# Patient Record
Sex: MALE | Race: WHITE
[De-identification: names, ages, dates, MRNs, and addresses within clinical notes are randomized per-mention and may not be internally consistent; named-entity substitution may affect disease eponyms.]

---

## 2017-05-28 ENCOUNTER — HOSPITAL ENCOUNTER (EMERGENCY)
Dept: HOSPITAL 76 - ED | Age: 47
Discharge: HOME | End: 2017-05-28
Payer: COMMERCIAL

## 2017-05-28 VITALS — DIASTOLIC BLOOD PRESSURE: 83 MMHG | SYSTOLIC BLOOD PRESSURE: 132 MMHG

## 2017-05-28 DIAGNOSIS — M54.6: Primary | ICD-10-CM

## 2017-05-28 DIAGNOSIS — R07.9: ICD-10-CM

## 2017-05-28 DIAGNOSIS — R91.8: ICD-10-CM

## 2017-05-28 LAB
ALBUMIN/GLOB SERPL: 1.6 {RATIO} (ref 1–2.2)
ANION GAP SERPL CALCULATED.4IONS-SCNC: 9 MMOL/L (ref 6–13)
BASOPHILS NFR BLD AUTO: 0 10^3/UL (ref 0–0.1)
BASOPHILS NFR BLD AUTO: 0.6 %
BILIRUB BLD-MCNC: 1.4 MG/DL (ref 0.2–1)
BUN SERPL-MCNC: 21 MG/DL (ref 6–20)
CALCIUM UR-MCNC: 9.6 MG/DL (ref 8.5–10.3)
CHLORIDE SERPL-SCNC: 105 MMOL/L (ref 101–111)
CO2 SERPL-SCNC: 25 MMOL/L (ref 21–32)
CREAT SERPLBLD-SCNC: 1 MG/DL (ref 0.6–1.2)
EOSINOPHIL # BLD AUTO: 0.1 10^3/UL (ref 0–0.7)
EOSINOPHIL NFR BLD AUTO: 1.6 %
ERYTHROCYTE [DISTWIDTH] IN BLOOD BY AUTOMATED COUNT: 14 % (ref 12–15)
GFRSERPLBLD MDRD-ARVRAT: 81 ML/MIN/{1.73_M2} (ref 89–?)
GLOBULIN SER-MCNC: 2.9 G/DL (ref 2.1–4.2)
GLUCOSE SERPL-MCNC: 96 MG/DL (ref 70–100)
HCT VFR BLD AUTO: 43.9 % (ref 42–52)
HGB UR QL STRIP: 15.8 G/DL (ref 14–18)
LIPASE SERPL-CCNC: 22 U/L (ref 22–51)
LYMPHOCYTES # SPEC AUTO: 2.1 10^3/UL (ref 1.5–3.5)
LYMPHOCYTES NFR BLD AUTO: 38.1 %
MCH RBC QN AUTO: 30.9 PG (ref 27–31)
MCHC RBC AUTO-ENTMCNC: 36 G/DL (ref 32–36)
MCV RBC AUTO: 85.8 FL (ref 80–94)
MONOCYTES # BLD AUTO: 0.4 10^3/UL (ref 0–1)
MONOCYTES NFR BLD AUTO: 7.6 %
NEUTROPHILS # BLD AUTO: 2.8 10^3/UL (ref 1.5–6.6)
NEUTROPHILS # SNV AUTO: 5.4 X10^3/UL (ref 4.8–10.8)
NEUTROPHILS NFR BLD AUTO: 52.1 %
NRBC # BLD AUTO: 0 /100WBC
PDW BLD AUTO: 7.4 FL (ref 7.4–11.4)
POTASSIUM SERPL-SCNC: 4.2 MMOL/L (ref 3.5–5)
PROT SPEC-MCNC: 7.6 G/DL (ref 6.7–8.2)
RBC MAR: 5.12 10^6/UL (ref 4.7–6.1)
SODIUM SERPLBLD-SCNC: 139 MMOL/L (ref 135–145)
WBC # BLD: 5.4 X10^3/UL

## 2017-05-28 PROCEDURE — 83690 ASSAY OF LIPASE: CPT

## 2017-05-28 PROCEDURE — 71010: CPT

## 2017-05-28 PROCEDURE — 99283 EMERGENCY DEPT VISIT LOW MDM: CPT

## 2017-05-28 PROCEDURE — 93010 ELECTROCARDIOGRAM REPORT: CPT

## 2017-05-28 PROCEDURE — 84484 ASSAY OF TROPONIN QUANT: CPT

## 2017-05-28 PROCEDURE — 71275 CT ANGIOGRAPHY CHEST: CPT

## 2017-05-28 PROCEDURE — 99285 EMERGENCY DEPT VISIT HI MDM: CPT

## 2017-05-28 PROCEDURE — 36415 COLL VENOUS BLD VENIPUNCTURE: CPT

## 2017-05-28 PROCEDURE — 80053 COMPREHEN METABOLIC PANEL: CPT

## 2017-05-28 PROCEDURE — 85025 COMPLETE CBC W/AUTO DIFF WBC: CPT

## 2017-05-28 PROCEDURE — 93005 ELECTROCARDIOGRAM TRACING: CPT

## 2017-05-28 RX ADMIN — ALUMINUM HYDROXIDE, MAGNESIUM HYDROXIDE, AND SIMETHICONE STA ML: 200; 200; 20 SUSPENSION ORAL at 15:46

## 2017-05-28 RX ADMIN — PHENOBARBITAL, HYOSCYAMINE SULFATE, ATROPINE SULFATE, SCOPOLAMINE HYDROBROMIDE STA ML: 16.2; .1037; .0194; .0065 ELIXIR ORAL at 15:46

## 2017-05-28 RX ADMIN — HYDROCODONE BITARTRATE AND ACETAMINOPHEN STA TAB: 5; 325 TABLET ORAL at 19:29

## 2017-05-28 RX ADMIN — IOPAMIDOL ONE ML: 612 INJECTION, SOLUTION INTRAVENOUS at 17:50

## 2017-05-28 RX ADMIN — LIDOCAINE HYDROCHLORIDE STA ML: 20 SOLUTION ORAL; TOPICAL at 15:46

## 2017-05-28 NOTE — CT REPORT
EXAM:

CT ANGIOGRAM CHEST AND ABDOMEN

 

EXAM DATE: 5/28/2017 05:54 PM.

 

CLINICAL HISTORY: Sudden onset severe chest and thoracic back pain.

 

COMPARISONS: None.

 

TECHNIQUE: Routine axial helical CT angiographic imaging was performed through the chest and abdomen.
 IV Contrast: 100 mL Isovue-300. Reconstructions: Coronal, sagittal, and 3D MIP reconstructions of th
e aorta.

 

In accordance with CT protocol optimization, one or more of the following dose reduction techniques w
ere utilized for this exam: automated exposure control, adjustment of mA and/or KV based on patient s
ize, or use of iterative reconstructive technique.

 

FINDINGS: 

Vascular Structures: Normal. No aneurysm, dissection, or significant atherosclerotic disease of the t
horacic aorta or abdominal aorta. The visualized pulmonary, mesenteric, and solid organ vascular stru
ctures are also within normal limits.

 

Lungs/Pleura: No consolidation, nodules, or edema. No effusions or pneumothorax.

 

Mediastinum: Normal. No cardiac enlargement or adenopathy.

 

Solid Organs: Decreased hepatic attenuation consistent with fatty infiltration. The gallbladder, panc
reas, spleen, adrenal glands and kidneys are unremarkable.

 

Peritoneal Cavity: Normal. No free fluid, free air, or acute inflammatory process.

 

Bones: There is paravertebral soft tissue thickening and stranding from the T6-T11 level. No evidence
 of vertebral body fracture or other destructive bony lesion.

 

Other: None.

 

IMPRESSION: 

1. No evidence of aortic dissection or aneurysm.

2. Mid to lower thoracic spine paravertebral stranding and soft tissue thickening. The adjacent verte
bra appear unremarkable. This may be secondary to infection or neoplastic process such as lymphoma. F
urther evaluation with contrast enhanced thoracic spine MRI recommended.

3. Fatty liver.

 

[facility]

Referring Provider Line: [phone]

 

SITE ID: [id number]

## 2017-05-28 NOTE — CT PRELIMINARY REPORT
Accession: [id number]

Exam: CT Chest Angio (AORTA)

 

IMPRESSION: 

1. No evidence of aortic dissection or aneurysm.

2. Mid to lower thoracic spine paravertebral stranding and soft tissue thickening. The adjacent verte
bra appear unremarkable. This may be secondary to infection or neoplastic process such as lymphoma. F
urther evaluation with contrast enhanced thoracic spine MRI recommended.

3. Fatty liver.

 

[facility]

 

SITE ID: [id number]

## 2017-05-28 NOTE — ED PHYSICIAN DOCUMENTATION
PD HPI CHEST PAIN





- Stated complaint


Stated Complaint: CHEST/BACK PX





- Chief complaint


Chief Complaint: Cardiac





- History obtained from


History obtained from: Patient





- History of Present Illness


Timing - onset: How many hours ago (1)


Timing - onset during: Light activity


Timing - details: Abrupt onset


Quality: Sharp


Location: Upper back


Worsened by: Inspiration


Associated symptoms: Shortness of air, Nausea.  No: Vomiting, Cough


Similar symptoms before: Has not had sx before





- Additional information


Additional information: 





The patient is a 46-year-old male who presents with sudden onset of upper back 

pain radiating to his chest about one hour prior to arrival.  He was reaching 

above his head when the pain began.  He reports associated shortness of breath 

and nausea.  He denies vomiting.  His pain was initially severe rated at 10/10 

in severity.  He currently rates it at 4/10 in severity.  He denies history of 

similar symptoms in the past.  He has never in a cigarette smoker.





Review of Systems


Constitutional: denies: Fever


Ears: denies: Tinnitus/ringing


Nose: denies: Congestion


Throat: denies: Sore throat


Cardiac: reports: Chest pain / pressure.  denies: Palpitations


Respiratory: reports: Dyspnea.  denies: Cough


GI: reports: Nausea.  denies: Abdominal Pain, Vomiting


: denies: Dysuria


Skin: denies: Rash


Musculoskeletal: reports: Back pain.  denies: Extremity pain


Neurologic: denies: Focal weakness, Numbness, Syncope, Headache





PD PAST MEDICAL HISTORY





- Past Medical History


Cardiovascular: None


Respiratory: None


Neuro: None


Endocrine/Autoimmune: None


GI: None





- Past Surgical History


Past Surgical History: No





- Present Medications


Home Medications: 


 Ambulatory Orders











 Medication  Instructions  Recorded  Confirmed


 


Esomeprazole Magnesium [Nexium] 20 mg PO DAILY 05/28/17 05/28/17


 


HYDROcod/ACETAM 5/325 [Norco 5/325] 1 - 2 ea PO Q6H PRN #20 tablet 05/28/17 














- Allergies


Allergies/Adverse Reactions: 


 Allergies











Allergy/AdvReac Type Severity Reaction Status Date / Time


 


No Known Drug Allergies Allergy   Verified 05/28/17 13:03














- Living Situation


Living Situation: reports: With family


Living Arrangement: reports: At home





- Social History


Does the pt smoke?: No


Smoking Status: Never smoker


Does the pt have substance abuse?: No





PD ED PE NORMAL





- Vitals


Vital signs reviewed: Yes (Initially hypertensive.)





- General


General: Alert and oriented X 3, Well developed/nourished, Other (Appears 

anxious and distressed.)





- HEENT


HEENT: Atraumatic, Pharynx benign





- Neck


Neck: Supple, no meningeal sign, No adenopathy, No JVD





- Cardiac


Cardiac: RRR, No murmur





- Respiratory


Respiratory: No respiratory distress, Clear bilaterally





- Abdomen


Abdomen: Soft, Non tender, No organomegaly





- Back


Back: No CVA TTP, No spinal TTP





- Derm


Derm: No rash





- Extremities


Extremities: No edema, No calf tenderness / cord





- Neuro


Neuro: Alert and oriented X 3, No motor deficit, No sensory deficit





Results





- Vitals


Vitals: 


 Vital Signs - 24 hr











  05/28/17 05/28/17 05/28/17





  13:02 13:47 14:30


 


Temperature 36.6 C  


 


Heart Rate 91  78


 


Respiratory 18  21





Rate   


 


Blood Pressure 154/103 H  145/81 H


 


Blood Pressure  156/88 H 





[Left]   


 


Blood Pressure  141/101 H 





[Right]   


 


O2 Saturation 95  96














  05/28/17 05/28/17





  16:00 19:00


 


Temperature  


 


Heart Rate 75 74


 


Respiratory 14 14





Rate  


 


Blood Pressure 139/71 H 132/83 H


 


Blood Pressure  





[Left]  


 


Blood Pressure  





[Right]  


 


O2 Saturation 97 100








 Oxygen











O2 Source                      Room air

















- EKG (time done)


  ** 13:22


Rate: Rate (enter#) (88)


Rhythm: NSR


Axis: Normal


Intervals: Normal OK


QRS: Normal


Ischemia: Normal ST segments


Computer interpretation: Agree with computer





- Labs


Labs: 


 Laboratory Tests











  05/28/17 05/28/17 05/28/17





  13:22 13:22 13:22


 


WBC  5.4  


 


RBC  5.12  


 


Hgb  15.8  


 


Hct  43.9  


 


MCV  85.8  


 


MCH  30.9  


 


MCHC  36.0  


 


RDW  14.0  


 


Plt Count  204  


 


MPV  7.4  


 


Neut #  2.8  


 


Lymph #  2.1  


 


Mono #  0.4  


 


Eos #  0.1  


 


Baso #  0.0  


 


Absolute Nucleated RBC  0.00  


 


Nucleated RBCs  0.0  


 


Sodium   139 


 


Potassium   4.2 


 


Chloride   105 


 


Carbon Dioxide   25 


 


Anion Gap   9.0 


 


BUN   21 H 


 


Creatinine   1.0 


 


Estimated GFR (MDRD)   81 L 


 


Glucose   96 


 


Calcium   9.6 


 


Total Bilirubin   1.4 H 


 


AST   38 


 


ALT   59 


 


Alkaline Phosphatase   57 


 


Troponin I    < 0.04


 


Total Protein   7.6 


 


Albumin   4.7 


 


Globulin   2.9 


 


Albumin/Globulin Ratio   1.6 


 


Lipase   22 














- Rads (name of study)


  ** Chest CT Angio


Radiology: Prelim report reviewed, EMP read contemporaneously, See rad report (1

) No evidence of aortic dissection or aneurysm. 2) Mid to lower thoracic spine 

paravertebral stranding and soft tissue thickening. The adjacent vertebra 

appear unremarkable. This may be secondary to infection or neoplastic process 

such as lymphoma. Further evaluation with contrast-enhanced thoracic spine MRI 

recommended fatty liver.)





PD MEDICAL DECISION MAKING





- ED course


Complexity details: reviewed results, re-evaluated patient, considered 

differential, d/w patient, d/w family


ED course: 





The patient's presentation is significant for abnormal findings of mid to lower 

thoracic paravertebral soft tissue on CT scan.  There is no evidence of 

pneumothorax, pneumomediastinum, aortic abnormality, or acute myocardial 

ischemia.  CT scan of the chest with contrast was interpreted by the 

radiologist as revealing mid to lower thoracic paravertebral stranding and soft 

tissue thickening.  MRI with contrast is recommended to further evaluate the 

possibility of infection or neoplastic process, such as lymphoma.  

Electrocardiogram, CBC, and chemistry panel are all normal.  White count is 

normal at 5.4.


Treatment in the emergency department included administration of GI cocktail. 

His pain was minimal at the time of administration, and the GI cocktail did not 

provide any change in his symptoms.  Vicodin one tablet was administered 

orally. I discussed with him and his wife the results of the CT scan, and the 

importance of follow-up for the MRI as recommended.  Copies of the imaging 

study were made for him to take to his follow-up physician.  MRI is not 

available at this time for stat study.


The patient is being discharged with prescription for Vicodin, 20 tablets.  I 

discussed with him potentially worrisome signs or symptoms that should prompt 

reevaluation in the emergency department.





Departure





- Departure


Disposition: 01 Home, Self Care


Clinical Impression: 


 Abnormal CT scan, chest





Back pain


Qualifiers:


 Back pain location: thoracic back pain Chronicity: acute Back pain laterality: 

midline Qualified Code(s): M54.6 - Pain in thoracic spine


Condition: Stable


Instructions:  ED Neck Back Pain General


Follow-Up: 


[facility] [place] [Provider Group]


Prescriptions: 


HYDROcod/ACETAM 5/325 [Norco 5/325] 1 - 2 ea PO Q6H PRN #20 tablet


 PRN Reason: Pain


Comments: 


The CT scan of your chest had abnormal findings that warrant further evaluation 

with MRI.


You can use Vicodin as prescribed if needed for pain.


Drink plenty of fluids.


Follow up with your primary physician as soon as possible. Call tomorrow to 

schedule an appointment.


Return to the emergency department if you develop increasing pain, worsening 

shortness of breath, or otherwise worsening symptoms.


Discharge Date/Time: 05/28/17 19:10

## 2017-05-28 NOTE — XRAY PRELIMINARY REPORT
Accession: [id number]

Exam: XR Chest 1 View

 

IMPRESSION: Normal single view chest.

 

[facility]

 

SITE ID: [id number]

## 2017-05-28 NOTE — XRAY REPORT
EXAM: 

CHEST RADIOGRAPHY

 

EXAM DATE: 5/28/2017 02:10 PM.

 

CLINICAL HISTORY: Chest pain and dyspnea.

 

COMPARISON: None.

 

TECHNIQUE: 1 view.

 

FINDINGS:

Lungs/Pleura: No focal opacities evident. No pleural effusion. No  pneumothorax.

 

Mediastinum: Within exam limitations, cardiomediastinal contour is normal.

 

Other: None.

 

IMPRESSION: Normal single view chest.

 

[facility]

Referring Provider Line: [phone]

 

SITE ID: [id number]

## 2017-06-24 ENCOUNTER — HOSPITAL ENCOUNTER (OUTPATIENT)
Dept: HOSPITAL 76 - DI | Age: 47
Discharge: HOME | End: 2017-06-24
Attending: FAMILY MEDICINE
Payer: COMMERCIAL

## 2017-06-24 DIAGNOSIS — M89.8X8: ICD-10-CM

## 2017-06-24 DIAGNOSIS — M51.34: Primary | ICD-10-CM

## 2017-06-24 PROCEDURE — 72157 MRI CHEST SPINE W/O & W/DYE: CPT

## 2017-06-26 NOTE — MRI REPORT
EXAM:

MRI THORACIC SPINE WITHOUT AND WITH CONTRAST

 

EXAM DATE: 6/24/2017 02:56 PM.

 

CLINICAL HISTORY: Midthoracic back pain for 34 weeks, slightly improved.

 

COMPARISONS: Chest CT from 05/28/2017.

 

TECHNIQUE: Multiplanar, multisequence T1-weighted and fluid-sensitive sequences of the thoracic spine
 from C7 to L1 before and after administration of intravenous contrast. IV contrast: 11.5 mL of Gadav
ist. Other: None.

 

FINDINGS: 

Spinal Cord: No signal abnormality in the visualized spinal cord.

 

Alignment: There is no spondylolisthesis.

 

Bone Marrow: No gross fractures or bone lesion. No bone marrow edema or abnormal enhancement.

 

Disk Levels/Facets: On sagittal images, multiple small disk bulges/herniations are scattered througho
ut the upper and midthoracic spine. However, no significant associated spinal canal or foraminal sten
osis is appreciated.

 

Spinal Canal: No enhancing masses within the spinal canal. No epidural abscess.

 

Musculature: Normal. No edema, enhancement, or fatty atrophy. 

 

Other: The previously described anterior paraspinal soft tissue thickening from T6-T11 has completely
 resolved. A fusiform shaped focus of T2 hyperintensity, T1 hypointensity, and non-enhancement is see
n in the region of the left intercostal nerve at T7-T8 (image 18, series 801) measuring 1.9 x 1.0 cm.
 Similar but smaller foci are seen on the right at T6-T7 measuring 7 mm (image 24, series 801) and bi
laterally measuring 45 millimeters at T8-T9T10 (image 4, series 801).

 

IMPRESSION: 

1. Normal thoracic spinal cord signal intensity without enhancement or impingement. 

2. Mild multilevel degenerative changes without spinal canal or foraminal stenosis. 

3. Several small nonenhancing fusiform foci of T2 hyperintensity are seen associated with multiple in
tercostal nerves in the mid and lower thoracic spine. The imaging appearance is most suggestive of no
nenhancing, cystic schwannomas. Pleural fibromas could also be considered.

 

[facility]

Referring Provider Line: [phone]

 

SITE ID: [id number]

## 2019-07-24 ENCOUNTER — HOSPITAL ENCOUNTER (OUTPATIENT)
Dept: HOSPITAL 76 - DI | Age: 49
Discharge: HOME | End: 2019-07-24
Attending: INTERNAL MEDICINE
Payer: COMMERCIAL

## 2019-07-24 DIAGNOSIS — R10.11: ICD-10-CM

## 2019-07-24 DIAGNOSIS — R10.13: Primary | ICD-10-CM

## 2019-07-24 PROCEDURE — 78227 HEPATOBIL SYST IMAGE W/DRUG: CPT

## 2019-07-25 NOTE — NUCLEAR MEDICINE REPORT
Reason:  DYSPEPSIA NONULCERATIVE, RUQ PAIN

Procedure Date:  07/24/2019   

Accession Number:  [id number] / [id number]                    

Procedure:  NM  - Hepatobiliary HIDA w/ Rx CPT Code:  

 

FULL RESULT:

 

 

EXAM:

HEPATOBILIARY SCAN WITH CCK/KINEVAC ADMINISTRATION

 

EXAM DATE: 7/24/2019 04:11 PM.

 

CLINICAL HISTORY: DYSPEPSIA NONULCERATIVE, RUQ PAIN.

 

COMPARISON: None.

 

TECHNIQUE: Following the intravenous administration of 5.1 mCi of Tc99m 

Mebrofenin, a hepatobiliary scan was done centered on the liver and 

gallbladder in multiple sequential images and projections.

 

Following the intravenous administration of 2.4 mcg of CCK/ Kinevac over 

the course of approximately 60 minutes, dynamic imaging was done and the 

gallbladder ejection fraction was calculated.

 

FINDINGS:

Normal clearance of blood pool activity indicating grossly normal 

hepatocellular function.

 

Liver size and shape grossly normal.

 

Appearance of tracer in the biliary tree as early as 0-5 minutes, within 

normal limits.

 

Appearance of tracer in the gallbladder as early as 5 minutes, within 

normal limits, with good progression of filling throughout the remainder 

of the initial hour.

 

Appearance of tracer in the small bowel following CCK administration.

 

Following CCK administration, gallbladder ejection fraction is calculated 

to be 98%, within the normal range (> 35%).

 

There is conspicuous enterogastric bile reflux following CCK 

administration.

IMPRESSION:

1. No scintigraphic evidence of acute cholecystitis.

2. Patent common bile duct.

3. Gallbladder ejection fraction is in the normal range.

4. There is conspicuous enterogastric bile reflux following CCK 

administration.

5. Mildly delayed biliary to bowel transit. This is a nonspecific finding 

that can be seen in a subset of normal patients, however differential 

considerations include biliary dyskinesia and chronic cholecystitis, 

among others.

 

[facility]

## 2021-07-07 ENCOUNTER — HOSPITAL ENCOUNTER (OUTPATIENT)
Dept: HOSPITAL 76 - SC | Age: 51
Discharge: HOME | End: 2021-07-07
Attending: NURSE PRACTITIONER
Payer: COMMERCIAL

## 2021-07-07 VITALS — SYSTOLIC BLOOD PRESSURE: 144 MMHG | DIASTOLIC BLOOD PRESSURE: 96 MMHG

## 2021-07-07 DIAGNOSIS — G47.33: Primary | ICD-10-CM

## 2021-07-07 DIAGNOSIS — E66.9: ICD-10-CM

## 2021-07-07 PROCEDURE — 99203 OFFICE O/P NEW LOW 30 MIN: CPT

## 2021-07-07 PROCEDURE — 99212 OFFICE O/P EST SF 10 MIN: CPT

## 2021-07-07 NOTE — SLEEP CARE CONSULTATION
Information from patient questionnaire entered by Amaris Dahl.





I have reviewed and concur with the information entered by Amaris Dahl. 

This document represents the service I personally performed and the decisions 

made by me, Norma Saunders ARNP.





History of Present Illness


Service Date and Time: 07/07/2021    1330


Reason for Visit: New patient, Previously diagnosed sleep apnea, sleep apnea on 

CPAP therapy


Chief Complaint: reports: Unrefreshed sleep, Snoring, Excessive daytime 

sleepiness, Observed pauses in breathing, Fatigue, Other (update supplies)


Date of Onset: 8-9 years


Usual bedtime: 10pm - 12am


Time it takes to fall asleep: 30 minutes


Snores at night: Yes


Observed to quit breathing while asleep: Yes


Sleeps alone due to snoring: Yes


Reasons for waking at night: reports: Snoring, Gasping for air


Toss, Turn, or Twitch while sleeping: Yes


Recalls having dreams: No


Usually gets out of bed at: 5-6 am


Feels refreshed in the morning: No


Morning headache: No


Sleepy or fatigued during the day: Yes


Ever fallen asleep while driving: No


Takes day naps: Yes


Dreams during day naps: No


Prior sleep studies: Yes


Year and Where:  - [facility]


Additional HPI information: 





MIRA WHITE was previously diagnosed to have very severe, AHI 60.6, 

obstructive sleep apnea-hypopnea syndrome and comes in today to establish care 

for BIPAP therapy. He states he was started years ago but had difficulty with 

using his CPAP and was changed to a BiPAP.  He continued to have difficulty with

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  He gradually stopped using the 

device and did not use it for a few years.  About 2 weeks ago, he started using 

the BIPAP because he was tired of being sleepy and fatigued all the time.  He 

would like to continue on the BiPAP therapy but needs supplies.4





- Parasomnia Symptoms


Ever been unable to move upon waking from sleep: No


Walks in sleep: No


Talks in sleep: No


Ever acted out dreams in sleep: No


Ever felt weak in the knees when startled or emotional: No


Bothered by creepy, crawly, restless sensations in legs: No


Problems with memory or concentration: Yes





CPAP Compliance Data


Compliance data discussion: 





He has not been getting supplies for years. He is using a medium ResMed full 

face mask F20. He has a ResMed machine. 





Subjective


Missed days of use due to: reports: travel (back to back deployments), other 

(hard to wear all night)


Patient concerns: reports: mask discomfort, condensation in mask/hose 

(occasionally).  denies: aerophagia, air blowing in eyes, mask leak noise, nasal

congestion, dry mouth, nose, throat, epistaxis, other


Observed to snore while using device: No


Current pressure setting perceived as: varies


On therapy, patient: reports: sleeping better, awakening more refreshed, being 

more awake and alert during the day, more rested overall.  denies: drowsiness 

while driving


Initial [place] Sleepiness Scale score: 17 (in 2021)





Past Medical History


Past Medical History: reports: Hypertension, Anxiety, Depression, GERD





Social History


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 Patient is  and lives in 

[place]. 





Have you smoked in the past 12 months: No


Alcohol use: Yes


Alcohol amount and frequency: 4-6 drinks 3 times a week


Caffeine use: Yes


Caffeine amount and frequency: 2 - 16oz cups daily





Family History


Family history of sleep disordered breathing: No


Family Hx Sleep Apnea: Mother: Snoring, Father: Snoring





Allergies and Home Medications


Drug allergies reviewed: Yes (NKDA)


Home medication list reviewed: Yes


Allergy and home medication list: 





Lisinopril


Omeprazole








Review of Systems


Weight gain over past 5 years: 15-20


Cardiovascular: reports: high blood pressure


Gastrointestinal: reports: heartburn


Neurological: denies: headaches


Psychiatric: reports: anxiety, depression


Ear/Nose/Throat: reports: nasal congestion, wisdom teeth removed (still has one 

left).  denies: tonsillectomy


Endocrine: denies: thyroid disease


Musculoskeletal: reports: joint pain


Immunologic: reports: allergies to food or environment (seasonal)





Physical Exam


Blood Pressure: 144/96


Cuff size: wrist


Heart Rate: 80


O2 Saturation: 98


Height: 6 ft


Weight: 267 lb


Body Mass Index: 36.2


BMI Classification: Obese


Heart: regular rate and rhythm


Lungs: clear bilaterally





Impression and Plan





1. Obstructive Sleep Apnea-Hypopnea Syndrome, very severe, with unknown 

treatment compliance and unknown apnea control. We were unable to obtain his 

compliance report during his office visit. On BIPAP therapy, the patient has 

found that he has better sleep quality and is more rested overall. He restarted 

his BiPAP on his own a few weeks ago and would like to continue to try to 

improve his daily fatigue. I will follow up with him in 1 month so that I might 

recheck compliance information for the insurance and DME companies.  I will try 

to update his supplies since he has not been able to get supplies for couple of 

years. Patient's apnea severity and rationale for treatment to reduce apnea, 

improve sleep quality and reduce cardiovascular and cerebrovascular events was 

reviewed. I also reviewed the benefit of consistent device use of BIPAP for 

hypertension,  gastric reflux, depression and anxiety.





* Continue current BIPAP pressure


* Update supplies


* Obtain compliance report with current pressure settings


* Notify me if snoring with mask or feeling that the pressure is too much or too

  little


* Attempt to lose weight


* Call this office if any problems using BIPAP


* Return for follow up in 1 month to recheck compliance, or sooner if concerns 

  arise





Counseling Topics: Weight loss health impact


Visit Type: In Office


Time Spent with Patient (minutes): 34


Provider Statement: I spent 100% of the Face to Face Visit with the patient with

greater than 50% spent counseling the patient and coordination of care.

## 2021-08-11 ENCOUNTER — HOSPITAL ENCOUNTER (OUTPATIENT)
Dept: HOSPITAL 76 - SC | Age: 51
Discharge: HOME | End: 2021-08-11
Attending: NURSE PRACTITIONER
Payer: COMMERCIAL

## 2021-08-11 DIAGNOSIS — E66.9: ICD-10-CM

## 2021-08-11 DIAGNOSIS — G47.33: Primary | ICD-10-CM

## 2021-08-11 PROCEDURE — 99212 OFFICE O/P EST SF 10 MIN: CPT

## 2021-08-11 NOTE — SLEEP CARE CONSULTATION
Information from patient questionnaire entered by Amaris Dahl.





I have reviewed and concur with the information entered by Amaris Dahl. 

This document represents the service I personally performed and the decisions 

made by me, Norma Saunders ARNP.





History of Present Illness


Service Date and Time: 2021    1304


Previous diagnosis: Very Severe, Obstructive Sleep Apnea-Hypopnea Syndrome


AHI: 60.6 (in )


Reason for follow up: one month


Equipment type: BiPAP


Equipment obtained from: Hired (no supplies yet)


Mask style: Full face (medium)


Mask brand: Resmed (F20)


Backup mask available: No (will keep old mask when replaced)


Last cushion change: 6 weeks


Prior sleep studies: Yes


Year and Where: [address] [place] [address] Sleep Lab


Type of Sleep Study: Polysomnography


HPI additional information: 





MIRA WHITE was diagnosed to have very severe, AHI 60.6, obstructive sleep 

apnea-hypopnea syndrome and returned today for BIPAP therapy one month follow-

up.





CPAP Compliance Data





- Data Reviewed with Patient


Average duration of nightly device use: 3 hr 42 min


Compliance rate %: 33


Current pressure setting (cmH2O): 18.2/6


Humidity settin


Average residual AHI: 3.0





Subjective


Patient concerns: reports: air blowing in eyes (just needs adjustment of 

straps), mask leak noise.  denies: aerophagia, mask discomfort, condensation in 

mask/hose, nasal congestion, dry mouth, nose, throat, epistaxis, other


Observed to snore while using device: No


Current pressure setting perceived as: comfortable


On therapy, patient: reports: sleeping better, awakening more refreshed, being 

more awake and alert during the day, more rested overall.  denies: drowsiness 

while driving


Initial [place] Sleepiness Scale score: 17 (in )


Current [place] Sleepiness Scale score: 14





Allergies and Home Medications


Home medication list reviewed: Yes (escitalopram for anxiety)





Review of Systems


Review of systems same as previous: Yes (no changes)





Physical Exam


Heart Rate: 87


O2 Saturation: 98


Height: 6 ft


Weight: 274 lb


Body Mass Index: 37.1


BMI Classification: Obese





Impression and Plan





1. Obstructive Sleep Apnea-Hypopnea Syndrome, very severe, with poor treatment 

compliance and good apnea control. On BIPAP therapy, the patient has better 

sleep quality and is more rested overall. Patient has been able to bring his 

compliance up but it is still not back compliance, 33%.  Patient states in the 

last month he has been trying to use it every night.  He is getting just under 4

hours on average.  He only used it 20 out of the last 30 days. I encouraged him 

to try to get at least 4 hours if not more every night that he is using it.  He 

is happy with current pressure settings and we will continue these current 

settings.  I will follow-up with him in a month to see if he can reach 

compliance so that we can get him new supplies and update his device.  He voiced

understanding and agreement with this plan of care.  Patient also encouraged to 

lose weight as this will help reduce his apneas. Patient's apnea severity and 

rationale for treatment to reduce apnea, improve sleep quality and reduce 

cardiovascular and cerebrovascular events was reviewed. I also reviewed the 

benefit of consistent device use of BIPAP for hypertension,  gastric reflux, 

depression and anxiety.





* Continue auto BIPAP pressure at 18.2/6 cmH2O


* Notify me if snoring with mask or feeling that the pressure is too much or too

  little


* Attempt to lose weight


* Call this office if any problems using BIPAP


* Return for follow up in 1-2 months to recheck compliance, or sooner if 

  concerns arise





Counseling Topics: Spare mask, Weight loss health impact


Visit Type: In Office


Time Spent with Patient (minutes): 13


Provider Statement: I spent 100% of the Face to Face Visit with the patient with

greater than 50% spent counseling the patient and coordination of care.

## 2021-09-08 ENCOUNTER — HOSPITAL ENCOUNTER (OUTPATIENT)
Dept: HOSPITAL 76 - SC | Age: 51
Discharge: HOME | End: 2021-09-08
Attending: NURSE PRACTITIONER
Payer: COMMERCIAL

## 2021-09-08 DIAGNOSIS — G47.33: Primary | ICD-10-CM

## 2021-09-08 DIAGNOSIS — E66.9: ICD-10-CM

## 2021-09-08 PROCEDURE — 99212 OFFICE O/P EST SF 10 MIN: CPT

## 2021-09-08 NOTE — SLEEP CARE CONSULTATION
Information from patient questionnaire entered by Amaris Dahl.





I have reviewed and concur with the information entered by Amaris Dahl. 

This document represents the service I personally performed and the decisions 

made by me, Norma Saunders ARNP.





History of Present Illness


Service Date and Time: 2021    1304


Previous diagnosis: Very Severe, Obstructive Sleep Apnea-Hypopnea Syndrome


AHI: 60.6 (in )


Reason for follow up: one month


Equipment type: BiPAP


Equipment obtained from: [employer] (not getting supplies, needs new prescription)


Mask style: Full face


Mask brand: Resmed (F-20)


Backup mask available: No (will keep old mask when replaced)


Prior sleep studies: Yes


Year and Where: [address] [place] [address] Sleep Lab


Type of Sleep Study: Polysomnography


HPI additional information: 





MIRA WHITE was diagnosed to have very severe, AHI 60.6, obstructive sleep 

apnea-hypopnea syndrome and returned today for BIPAP therapy one month follow-

up.





CPAP Compliance Data





- Data Reviewed with Patient


Average duration of nightly device use: 5 hr 43 min


Compliance rate %: 77


Current pressure setting (cmH2O): 18.2/6


Humidity settin


Average residual AHI: 2.8





Subjective


Patient concerns: reports: other (headstrap getting loose).  denies: aerophagia,

mask discomfort, air blowing in eyes, mask leak noise, condensation in 

mask/hose, nasal congestion, dry mouth, nose, throat, epistaxis


Observed to snore while using device: No


Current pressure setting perceived as: comfortable


On therapy, patient: reports: sleeping better, awakening more refreshed, being 

more awake and alert during the day, more rested overall.  denies: drowsiness 

while driving


Initial [place] Sleepiness Scale score: 17 (in )


Current [place] Sleepiness Scale score: 8





Allergies and Home Medications


Home medication list reviewed: Yes (no changes)





Review of Systems


Review of systems same as previous: Yes (no changes)





Physical Exam


Heart Rate: 84


O2 Saturation: 98


Height: 6 ft


Weight: 269 lb


Body Mass Index: 36.4


BMI Classification: Obese





Impression and Plan





1. Obstructive Sleep Apnea-Hypopnea Syndrome, very severe, with good treatment 

compliance and good apnea control. On BIPAP therapy, the patient has better 

sleep quality and is more rested overall. Patient has met compliance and is due 

for a new device.  Patient also needs supplies since he has not been able to get

some for quite some time. The patients BIPAP is over 5 years old and of 

reasonable use. Thus, the BIPAP will be updated. A DWO prescription will be 

made. Compliance guidelines for new device and follow up discussed. Patient was 

encouraged to lose weight for their overall health and to reduce apneas. 

Patient's apnea severity and rationale for treatment to reduce apnea, improve 

sleep quality and reduce cardiovascular and cerebrovascular events was reviewed.

I also reviewed the benefit of consistent device use of BIPAP for hypertension, 

gastric reflux, depression and anxiety.





* Continue BIPAP pressure at 18.2/6 cmH2O


* Update device and supplies


* Notify me if snoring with mask or feeling that the pressure is too much or too

  little


* Attempt to lose weight


* Call this office if any problems using BIPAP


* Return for follow up one month after obtaining new device, or sooner if 

  concerns arise





Counseling Topics: Spare mask, Weight loss health impact


Visit Type: In Office


Time Spent with Patient (minutes): 15


Provider Statement: I spent 100% of the Face to Face Visit with the patient with

greater than 50% spent counseling the patient and coordination of care.

## 2021-11-09 ENCOUNTER — HOSPITAL ENCOUNTER (OUTPATIENT)
Dept: HOSPITAL 76 - SC | Age: 51
Discharge: HOME | End: 2021-11-09
Attending: NURSE PRACTITIONER
Payer: COMMERCIAL

## 2021-11-09 VITALS — SYSTOLIC BLOOD PRESSURE: 112 MMHG | DIASTOLIC BLOOD PRESSURE: 69 MMHG

## 2021-11-09 DIAGNOSIS — E66.9: ICD-10-CM

## 2021-11-09 DIAGNOSIS — G47.33: Primary | ICD-10-CM

## 2021-11-09 PROCEDURE — 99212 OFFICE O/P EST SF 10 MIN: CPT

## 2021-11-09 NOTE — SLEEP CARE CONSULTATION
Information from patient questionnaire entered by Karmen Sims MA.





I have reviewed and concur with the information entered by Karmen Sims MA. 

This document represents the service I personally performed and the decisions 

made by me, Norma Saunders ARNP.





History of Present Illness


Service Date and Time: 11/09/2021    1249


Previous diagnosis: Very Severe, Obstructive Sleep Apnea-Hypopnea Syndrome


AHI: 60.6 (in 2015)


Reason for follow up: first compliance after device update, other (9/26)


Equipment type: BiPAP


Equipment obtained from: Other ([facility]; getting supplies as needed)


Mask style: Full face


Backup mask available: No (will keep old mask when replaced)


Last cushion change: 2-3 weeks


Prior sleep studies: Yes


Year and Where: [address] [place] [address] Sleep Lab


Type of Sleep Study: Polysomnography


HPI additional information: 





MIRA WHITE was diagnosed to have severe, AHI 60.6, obstructive sleep apnea-

hypopnea syndrome and returned today for BIPAP therapy first compliance after 

updating device follow-up.





Sleep Study





- Results


Type of Sleep Study: Polysomnography


Prior sleep studies: Yes


Year and Where: [address] [place] [address] Sleep Lab





CPAP Compliance Data





- Data Reviewed with Patient


Average duration of nightly device use: 5 hours 39 minutes


Compliance rate %: 76


Current pressure setting (cmH2O): 18.2/6


Average residual AHI: 2.9





Subjective


Patient concerns: denies: aerophagia, mask discomfort, air blowing in eyes, mask

leak noise, condensation in mask/hose, nasal congestion, dry mouth, nose, 

throat, epistaxis, other


Observed to snore while using device: No


Current pressure setting perceived as: comfortable


On therapy, patient: reports: sleeping better, awakening more refreshed, being 

more awake and alert during the day, more rested overall.  denies: drowsiness 

while driving


Initial [place] Sleepiness Scale score: 17 (in 2021)


Current [place] Sleepiness Scale score: 5 (2021)





Allergies and Home Medications


Home medication list reviewed: Yes (Buspar)





Review of Systems


Review of systems same as previous: Yes (no changes)





Physical Exam


Vital signs obtained and entered by: Bethany KRUSE


Blood Pressure: 112/69 (Left)


Cuff size: wrist


Heart Rate: 80


O2 Saturation: 98 (with mask)


Height: 6 ft


Weight: 271 lb (with boots)


Body Mass Index: 36.7


BMI Classification: Obese





Impression and Plan





1. Obstructive Sleep Apnea-Hypopnea Syndrome, very severe, with good treatment 

compliance and good apnea control. On BIPAP therapy, the patient has better 

sleep quality and is more rested overall. Patient is using intermittent fasting 

to try to lose weight. He states it is hard to get used to but eventually it 

becomes easier. He has not changed in weight much yet. He is happy with current 

BIPAP therapy and has significant improvement of his sleep apnea. Patient's 

apnea severity and rationale for treatment to reduce apnea, improve sleep qu

ality and reduce cardiovascular and cerebrovascular events was reviewed. I also 

reviewed the benefit of consistent device use of BIPAP for hypertension, gastric

reflux, depression and anxiety.





* Continue BIPAP pressure at 18.2/6 cmH2O


* Notify me if snoring with mask or feeling that the pressure is too much or too

  little


* Continue to try to lose weight


* Call this office if any problems using BIPAP


* Return for follow up in 1 year, or sooner if concerns arise





Counseling Topics: Spare mask, Weight loss health impact


Visit Type: In Office


Time Spent with Patient (minutes): 14


Provider Statement: I spent 100% of the Face to Face Visit with the patient with

greater than 50% spent counseling the patient and coordination of care.

## 2022-01-17 ENCOUNTER — HOSPITAL ENCOUNTER (OUTPATIENT)
Dept: HOSPITAL 76 - DI | Age: 52
Discharge: HOME | End: 2022-01-17
Attending: HEALTH CARE PROVIDER
Payer: COMMERCIAL

## 2022-01-17 DIAGNOSIS — M47.812: ICD-10-CM

## 2022-01-17 DIAGNOSIS — G25.2: Primary | ICD-10-CM

## 2022-01-17 DIAGNOSIS — M48.02: ICD-10-CM

## 2022-01-17 DIAGNOSIS — R90.89: ICD-10-CM

## 2022-01-17 DIAGNOSIS — M50.322: ICD-10-CM

## 2022-01-17 PROCEDURE — 72156 MRI NECK SPINE W/O & W/DYE: CPT

## 2022-01-17 PROCEDURE — 70553 MRI BRAIN STEM W/O & W/DYE: CPT

## 2022-01-17 NOTE — MRI REPORT
PROCEDURE:  Brain W/WO

 

INDICATIONS:  RESTING TREMOR, UPPER BODY WEAKNESS, FAM HX MS

 

CONTRAST:  IV CONTRAST: Gadavist ml: 11.7  

 

TECHNIQUE:  

Noncontrast axial T1 spin echo, axial T2 fast spin echo, sagittal and axial FLAIR, coronal T2 fast sp
in echo, axial gradient echo, axial diffusion and ADC through the brain.  After the administration of
 contrast, axial and coronal T1 spin echo with fat saturation through the brain.  

 

COMPARISON:  None.

 

FINDINGS:  

Image quality:  Excellent.  

 

CSF spaces:  Basal cisterns are patent.  No extra-axial fluid collections.  Ventricles are normal in 
size and shape.  

 

Brain:  No midline shift.  No intracranial bleeds or masses.  No abnormal intracranial enhancement.  
There is cerebral volume loss for age.  There are a few, scattered, punctate foci of increased T2 sig
nal in the periventricular and subcortical white matter tracks. The brainstem appears normal.  Diffus
ion-weighted images demonstrate no acute ischemic insults.  No chronic ischemic insults.  Normal intr
avascular flow voids are present.  

 

Skull and face:  Calvarial marrow is normal in signal.  Orbits appear normal.  

 

Sinuses:  Sinuses and mastoids appear clear.  

 

 

IMPRESSION:  

 

1. No acute intracranial disease process.

 

2. A few, small, punctate foci foci of increased T2 signal in the periventricular and subcortical whi
te matter tracks. Lesions are nonspecific and may represent mild manifestation of chronic microvascul
ar ischemic changes, sequela of diabetes or hypertension, sequela of migraine headaches or less likel
y demyelinating process including multiple sclerosis.

 

3. No abnormal intracranial mass or mass effect.

 

4. No suspicious postcontrast enhancement.

 

 

 

Reviewed by: Nikki Arevalo MD, PhD on 1/17/2022 4:04 PM [facility]

Approved by: Nikki Arevalo MD, PhD on 1/17/2022 4:04 PM [facility]

 

 

Station ID:  [id number]

## 2022-01-17 NOTE — MRI REPORT
PROCEDURE:  Cervical Spine W/WO

 

INDICATIONS:  RESTING TREMOR, UPPER BODY WEAKNESS, FAM HX MS

 

CONTRAST:  IV CONTRAST: Gadavist ml: 11.7  

 

TECHNIQUE:  

Noncontrast sagittal T1 spin echo and T2 fast spin echo, sagittal STIR, sagittal PD fast spin echo, f
oraminal oblique sagittal T2 fast spin echo, axial gradient echo or T2 fast spin echo through the cer
vical spine.  After the administration of contrast, sagittal and axial T1 spin echo with fat saturati
on through the cervical spine.  

 

COMPARISON:  Correlation is made with the accompanying brain MRI, 1/17/2022.

 

FINDINGS:  

Image quality:   Motion artifact is noted.  

 

Alignment and curvature:  There is normal bony alignment.  

 

Marrow:  Marrow demonstrates normal overall signal.  

 

Spinal cord:  Visualized spinal cord is normal in size, without white matter lesions.  No suspicious 
intramedullary enhancement.  No cerebellar tonsillar herniation.  

 

Paraspinous soft tissues:  No paravertebral masses or suspicious enhancement.  

 

C2-C3:  The disc height and disc signal are well preserved. Mild disc osteophyte complex is seen.   M
oderate facet hypertrophy is seen.  There is moderate right-sided and no left-sided neuroforaminal na
rrowing. No significant central canal narrowing is seen.  

 

C3-C4:   The disc height and disc signal are well preserved. Moderate disc osteophyte complex is seen
, which is eccentric to the right. At least moderate facet hypertrophy is seen. There is moderate to 
severe right-sided and at least moderate left-sided neuroforaminal narrowing.  Mild central canal frances
rowing is seen.  

 

C4-C5:  The disc height and disc signal are well preserved. Mild to moderate disc osteophyte complex 
is seen. At least moderate facet hypertrophy is seen, right worse than left. There is moderate to sev
ere bilateral neuroforaminal narrowing seen.  Mild central canal narrowing is seen.  

 

C5-C6:  Mild loss of disc height and disc signal are seen. Moderate disc osteophyte complex is seen. 
There is a central/left disc osteophyte protrusion seen, as on series 801 image 13.  Moderate facet h
ypertrophy is seen.   There is moderate to severe bilateral neuroforaminal narrowing, left worse than
 right. At least moderate central canal narrowing is seen, with associated mass effect upon the ventr
al spinal cord.

 

C6-C7:  The disc height and disc signal are well preserved.  Mild disc osteophyte complex is seen.   
Mild facet hypertrophy is seen.  At least moderate bilateral neuroforaminal narrowing can be seen.  M
ild central canal narrowing is seen.  

 

C7-T1:  The disc height and disc signal are well preserved. Moderate disc osteophyte complex is seen,
 with a central/right disc osteophyte protrusion, as on series 801 image 5.  Mild facet hypertrophy i
s seen.  There is moderate bilateral neuroforaminal narrowing seen.  Moderate central canal narrowing
 is seen.    Associated mass effect is seen upon the ventral spinal cord. 

 

  

 

IMPRESSION:  

 

No suspicious white matter lesions are seen within the cervical cord.

 

Multiple levels of cervical spine degenerative change are seen, which are overall worst at the C5-C6 
level.

 

Reviewed by: Khanh Burrows MD on 1/17/2022 1:50 PM [facility]

Approved by: Khanh Burrows MD on 1/17/2022 1:50 PM [facility]

 

 

Station ID:  [id number]